# Patient Record
Sex: FEMALE | URBAN - METROPOLITAN AREA
[De-identification: names, ages, dates, MRNs, and addresses within clinical notes are randomized per-mention and may not be internally consistent; named-entity substitution may affect disease eponyms.]

---

## 2018-02-04 DIAGNOSIS — N20.0 NEPHROLITHIASIS: Primary | ICD-10-CM

## 2018-02-06 RX ORDER — HYDROCHLOROTHIAZIDE 50 MG/1
TABLET ORAL
Qty: 90 TABLET | Refills: 1 | Status: SHIPPED | OUTPATIENT
Start: 2018-02-06

## 2018-08-06 DIAGNOSIS — N20.0 NEPHROLITHIASIS: ICD-10-CM

## 2018-08-06 RX ORDER — HYDROCHLOROTHIAZIDE 50 MG/1
TABLET ORAL
Qty: 90 TABLET | Refills: 1 | OUTPATIENT
Start: 2018-08-06

## 2018-08-06 NOTE — TELEPHONE ENCOUNTER
Last office visit was 11/7/16 by Dr Ashley Baer  Refusal of medication stands until the patient schedules a follow-up office visit

## 2018-09-29 RX ORDER — ALLOPURINOL 100 MG/1
TABLET ORAL
Qty: 90 TABLET | Refills: 3 | OUTPATIENT
Start: 2018-09-29

## 2018-10-01 NOTE — TELEPHONE ENCOUNTER
Patient last seen 11/7/16  I left a message on her home phone voice mail regarding the need for an appointment BEFORE additional medications would be refilled  No further action required until patient returns the call

## 2018-10-01 NOTE — TELEPHONE ENCOUNTER
I returned the patient's call  We discussed the possibility of recurrent kidney stones being her greatest risk of not continuing medication therapy  Patient recently lost her full-time job and the part-time job she does have doesn't carry health insurance  I recommended making application for medical assistance ([place] or [employer]) as [facility] participates with both plans  [facility] is another mail order pharmacy vendor I would recommend  She will attempt to refill medications via her PCP and have them send to [facility]  Pharmacy discount cards might also be an option  As soon as she gets coverage, she will re-establish with Dr Yaw Galloway

## 2018-10-01 NOTE — TELEPHONE ENCOUNTER
Pt calling, would like a call back regarding medication from nurse, stating she does not have enough money to sched appt, wants to know the side effects of stopping medication, would like a call back    [phone]

## 2021-05-04 ENCOUNTER — TELEPHONE (OUTPATIENT)
Dept: UROLOGY | Facility: MEDICAL CENTER | Age: 66
End: 2021-05-04

## 2021-05-04 NOTE — TELEPHONE ENCOUNTER
Complaint/Diagnosis:Flank Pain/Kidney Stones     Insurance:[facility] ID# [id number]     History of Cancer:no    Previous urologist:Dr Parsons    Outside testing/where:[facility]    If yes,what kind:CT Scan/    Records requested/where:[facility]/urochart    Preferred location:[place]

## 2021-05-04 NOTE — TELEPHONE ENCOUNTER
Npt calling just discharged [facility] with stent placement for scheduled 5/25 procedure,she is calling requesting Ankita Heath take over her care as she was previous pt and not impressed with [facility],I informed her I would forward her request and information for review

## 2021-05-05 NOTE — TELEPHONE ENCOUNTER
Call placed to patient and spoke with her  Informed her that 1st opening with Dr Tova Paris is not until July  Pt stated that is too long for her to wait  She would like to be seen by any available provider  Informed patient that 1st opening with any provider is not until mid June  Pt stated that she does not want to wait that long for an appointment  She will be contacting [facility] in regards to follow up with her stent and if further Urological care is needed she would like to follow up with [facility] at that time